# Patient Record
Sex: MALE | Race: WHITE | NOT HISPANIC OR LATINO | Employment: FULL TIME | URBAN - METROPOLITAN AREA
[De-identification: names, ages, dates, MRNs, and addresses within clinical notes are randomized per-mention and may not be internally consistent; named-entity substitution may affect disease eponyms.]

---

## 2024-09-23 ENCOUNTER — APPOINTMENT (OUTPATIENT)
Dept: RADIOLOGY | Facility: MEDICAL CENTER | Age: 54
End: 2024-09-23
Payer: COMMERCIAL

## 2024-09-23 VITALS
HEIGHT: 69 IN | WEIGHT: 229 LBS | SYSTOLIC BLOOD PRESSURE: 160 MMHG | HEART RATE: 59 BPM | BODY MASS INDEX: 33.92 KG/M2 | DIASTOLIC BLOOD PRESSURE: 85 MMHG

## 2024-09-23 DIAGNOSIS — M25.512 LEFT SHOULDER PAIN, UNSPECIFIED CHRONICITY: ICD-10-CM

## 2024-09-23 DIAGNOSIS — M19.012 PRIMARY OSTEOARTHRITIS OF LEFT SHOULDER: Primary | ICD-10-CM

## 2024-09-23 PROCEDURE — 20610 DRAIN/INJ JOINT/BURSA W/O US: CPT | Performed by: ORTHOPAEDIC SURGERY

## 2024-09-23 PROCEDURE — 99204 OFFICE O/P NEW MOD 45 MIN: CPT | Performed by: ORTHOPAEDIC SURGERY

## 2024-09-23 PROCEDURE — 73030 X-RAY EXAM OF SHOULDER: CPT

## 2024-09-23 RX ORDER — ATORVASTATIN CALCIUM 20 MG/1
TABLET, FILM COATED ORAL
COMMUNITY
Start: 2024-08-30

## 2024-09-23 RX ORDER — FENOFIBRATE 145 MG/1
TABLET, COATED ORAL
COMMUNITY
Start: 2024-07-08

## 2024-09-23 RX ORDER — METHYLPREDNISOLONE ACETATE 40 MG/ML
1 INJECTION, SUSPENSION INTRA-ARTICULAR; INTRALESIONAL; INTRAMUSCULAR; SOFT TISSUE
Status: COMPLETED | OUTPATIENT
Start: 2024-09-23 | End: 2024-09-23

## 2024-09-23 RX ORDER — BUPIVACAINE HYDROCHLORIDE 2.5 MG/ML
4 INJECTION, SOLUTION INFILTRATION; PERINEURAL
Status: COMPLETED | OUTPATIENT
Start: 2024-09-23 | End: 2024-09-23

## 2024-09-23 RX ADMIN — METHYLPREDNISOLONE ACETATE 1 ML: 40 INJECTION, SUSPENSION INTRA-ARTICULAR; INTRALESIONAL; INTRAMUSCULAR; SOFT TISSUE at 11:15

## 2024-09-23 RX ADMIN — BUPIVACAINE HYDROCHLORIDE 4 ML: 2.5 INJECTION, SOLUTION INFILTRATION; PERINEURAL at 11:15

## 2024-09-23 NOTE — PROGRESS NOTES
Orthopaedic Surgery - Office Note  Manpreet Sanchez (53 y.o. male)   : 1970   MRN: [id number]  Encounter Date: 2024    Assessment / Plan  Left shoulder severe glenohumeral osteoarthritis      X-rays reviewed and discussed with patient  Patient to be full weightbearing to LUE (Left Upper Extremity) as tolerated  ROM as tolerated to LUE (Left Upper Extremity)  Discussed with patient treatment options moving forward including over the counter analgesics, ROM, physical therapy, corticosteroid injection, total shoulder arthroplasty vs partial shoulder arthroplasty  Information regarding shoulder arthritis and arthroplasty of shoulder were placed in the patient's after visit summary.   Patient was informed shoulder arthroplasty is performed for pain relief, improved ROM, and is an elective procedure.   Patient was informed of post-operative care.  Home exercises were placed in the patient's after visit summary to continue with at home.   Patient to continue at home analgesic regimen with NSAID's  The patient was offered a corticosteroid injection for their left shoulder. They tolerated the procedure well.  The patient was educated they may have some irritation in the next few days and should rest, ice, elevate and perform gentle range of motion exercises. They were advised the medicine should begin to work in a few days time.  The patient was informed corticosteroid injections can be repeated at the earliest every 3 months.      Follow-up:  Return in about 4 months (around 2025) for f/u, L, Shoulder possible surgical discussion .      Chief Complaint / Date of Onset  Left shoulder pain   Injury Mechanism / Date  None  Surgery / Date  None    History of Present Illness   Manpreet Sanchez is a RHD 53 y.o. male who presents for initial evaluation of left shoulder pain on and off for many years. He has not had a specific injuries to the area. He has limited ROM with crepitus and pain. 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 The patient is a friend of Kelley, who trains him and he is not able to perform some of the exercises. He previously had a corticosteroid injection in 2019 with temporary relief. He has an increase in pain when side lying on the left. His goals are to increase ROM.  Patient is pre-diabetic with most recent hemoglobin A1c measuring 6.5 on 5/23/2024.     Treatment Summary  Medications / Modalities  NSAID's  E-Stim  Joshua   Bracing / Immobilization  None  Physical Therapy  4-6  weeks of formal PT in 2019  Exercises at the gym weekly  Injections  1 in 2019   Prior Surgeries  None  Other Treatments  None    Employment / Current Status  Nurse - works for [employer]    Sport / Organization / Current Status  Active       Review of Systems  Pertinent items are noted in HPI.  All other systems were reviewed and are negative.      Physical Exam  /85   Pulse 59   Ht 5' 9\" (1.753 m)   Wt 104 kg (229 lb)   BMI 33.82 kg/m²   Cons: Appears well.  No apparent distress.  Psych: Alert. Oriented x3.  Mood and affect normal.  Eyes: PERRLA, EOMI  Resp: Normal effort.  No audible wheezing or stridor.  CV: Palpable pulse.  No discernable arrhythmia.  No LE edema.  Lymph:  No palpable cervical, axillary, or inguinal lymphadenopathy.  Skin: Warm.  No palpable masses.  No visible lesions.  Neuro: Normal muscle tone.  Normal and symmetric DTR's.     Left Shoulder Exam  Alignment / Posture:  Normal shoulder posture.  Inspection:  No swelling. No edema. No erythema. No ecchymosis. No muscle atrophy. No deformity.  Palpation:  No tenderness.  ROM:  Shoulder FE 95. Shoulder ER 10. Shoulder IR glute. Shoulder AB 90  Strength:  Supraspinatus 5/5. Infraspinatus 5/5. Subscapularis 5/5.  Stability:  Not tested.  Tests: No pertinent positive or negative tests.  Neurovascular:  Sensation intact in Ax/R/M/U nerve distributions. 2+ radial pulse.        Studies Reviewed  XR of left shoulder - taken 9/23/24 " demonstrate severe degenerative changes of the glenohumeral joint with loss of sphere shape of humeral head. Large inferior osteophyte. Early osteophyte formation of the superior humeral head. No acute fracture.      Large joint arthrocentesis: L glenohumeral  Universal Protocol:  Consent: Verbal consent obtained.  Consent given by: patient  Patient understanding: patient states understanding of the procedure being performed  Patient consent: the patient's understanding of the procedure matches consent given  Test results: test results available and properly labeled  Site marked: the operative site was marked  Radiology Images displayed and confirmed. If images not available, report reviewed: imaging studies available  Patient identity confirmed: verbally with patient  Supporting Documentation  Indications: pain   Procedure Details  Location: shoulder - L glenohumeral  Preparation: Patient was prepped and draped in the usual sterile fashion  Needle size: 22 G  Ultrasound guidance: no  Approach: anterior  Medications administered: 4 mL bupivacaine 0.25 %; 1 mL methylPREDNISolone acetate 40 mg/mL    Patient tolerance: patient tolerated the procedure well with no immediate complications  Dressing:  Sterile dressing applied             Medical, Surgical, Family, and Social History  The patient's medical history, family history, and social history, were reviewed and updated as appropriate.    No past medical history on file.    No past surgical history on file.    No family history on file.    Social History     Occupational History    Not on file   Tobacco Use    Smoking status: Not on file    Smokeless tobacco: Not on file   Substance and Sexual Activity    Alcohol use: Not on file    Drug use: Not on file    Sexual activity: Not on file       No Known Allergies      Current Outpatient Medications:     atorvastatin (LIPITOR) 20 mg tablet, , Disp: , Rfl:     fenofibrate (TRICOR) 145 mg tablet, , Disp: , Rfl:       Steph  Anastacio    Scribe Attestation      I,:  Steph Chaves am acting as a scribe while in the presence of the attending physician.:       I,:  Wisam Garcias MD personally performed the services described in this documentation    as scribed in my presence.:

## 2024-09-23 NOTE — PATIENT INSTRUCTIONS
Arthritis of the Shoulder  Each year, millions of people worldwide are diagnosed with some form of arthritis. Simply defined, arthritis is inflammation of one or more of your joints. Ultimately, the inflammation leads to loss of cartilage, which is the soft lining of our joints that allows smooth and full range of motion.      In the shoulder, arthritis causes pain and stiffness that can make it difficult to lift your arm, brush your hair, reach up to a high shelf, or perform other tasks.    Although there is no predictable way to regrow cartilage -- and, thus, no cure for arthritis of the shoulder -- there are many treatment options available. Using these, most people with arthritis are able to manage pain and stay active.    Anatomy  The shoulder is a ball-and-socket joint made up of three bones:  The humerus (upper arm bone)  The scapula (shoulder blade)  The clavicle (collarbone)    The ball portion of the upper arm bone (humeral head) fits into a rounded socket in the shoulder blade. This socket is called the glenoid. A combination of muscles and tendons keeps your arm bone centered in your shoulder socket. These tissues are called the rotator cuff.    There are two joints in the shoulder, and both may be affected by arthritis:  One joint is located on the top of the shoulder, where the clavicle meets the tip of the shoulder blade (acromion). This is called the acromioclavicular (AC) joint.  The second is where the head of the humerus fits into the scapula and is called the glenohumeral joint.    The bones and joints of the shoulder.   Reproduced with permission from MASSIEL Alvarez, ed: Musculoskeletal Medicine. [place], [place], [employer], 2003.    To provide you with effective treatment, your doctor will need to determine which joint is affected and which type of arthritis you have.    Description  Five major types of arthritis typically affect the shoulder.    Osteoarthritis  Also  "known as \"wear-and-tear\" arthritis, osteoarthritis is a condition that destroys the smooth outer covering (articular cartilage) of bone. As the articular cartilage wears away, it becomes frayed and rough, and the protective space between the bones decreases. During movement, the bones of the joint rub against each other,  causing pain; this is often referred to as \"bone on bone\" arthritis.    Osteoarthritis usually affects people over the age of 50 and is more common in the acromioclavicular joint than in the glenohumeral shoulder joint.  While osteoarthritis of the AC joint can be seen on X-rays, it typically causes few to no symptoms.  Glenohumeral osteoarthritis causes symptoms more often than not.    (Left) An illustration of damaged cartilage in the glenohumeral joint. (Right) This X-ray of the shoulder shows osteoarthritis and a decreased joint space.   (Left) Reproduced with permission from MIRIAM Izquierdo, ed: Essentials of Musculoskeletal Care, ed 4. [place], [place], [employer], 2010.      Rheumatoid Arthritis  Rheumatoid arthritis (RA) is a chronic disease that attacks multiple joints throughout the body. It is symmetrical, meaning it usually affects the same joint on both sides of the body.  The joints of your body are covered with a lining -- called synovium -- that lubricates the joint and makes it easier to move. Rheumatoid arthritis causes the lining to swell, which causes pain and stiffness in the joint.    Rheumatoid arthritis is an autoimmune disease. This means that the immune system attacks its own tissues. In RA, the defenses that protect the body from infection instead damage normal tissue (such as cartilage and ligaments) and soften bone.    Rheumatoid arthritis is equally common in both joints of the shoulder (AC joint and glenohumeral joint).    Posttraumatic Arthritis  Posttraumatic arthritis is a form of osteoarthritis that develops after an injury, such as a fracture " or dislocation of the shoulder. It can affect both the AC and glenohumeral joints.    Rotator Cuff Tear Arthropathy  Arthritis can also develop after a large, long-standing rotator cuff tendon tear. The torn rotator cuff can no longer hold the head of the humerus in the glenoid socket, and the humerus can move upward and rub against the acromion. This can damage the surfaces of the bones, causing arthritis to develop.    The combination of a large rotator cuff tear and advanced arthritis can lead to severe pain and weakness, and the patient may not be able to lift their arm away from the side.    Rotator cuff arthropathy.     Avascular Necrosis  Avascular necrosis (AVN) of the shoulder is a painful condition that occurs when the blood supply to the head of the humerus is disrupted. Because bone cells die without a blood supply, AVN can ultimately lead to destruction of the shoulder joint and arthritis.    Avascular necrosis develops in stages. As it progresses, the dead bone gradually collapses, which damages the articular cartilage covering the bone and leads to arthritis. At first, AVN affects only the head of the humerus, but as AVN progresses, the collapsed head of the humerus can damage the glenoid socket as well.    Causes of AVN include:  High dose steroid use  Heavy alcohol consumption  Sickle cell disease  Traumatic injury, such as fractures of the shoulder    In some cases, no cause can be identified; this is referred to as idiopathic AVN.    Symptoms  Pain. The most common symptom of arthritis of the shoulder is pain. This pain is aggravated by activity and progressively gets worse over time. The location of the pain will vary, depending on which shoulder joint is affected:  If the glenohumeral joint is affected, the pain is centered in the side or back of the shoulder and may intensify with changes in the weather. Patients complain of an ache deep in the joint.  The pain of arthritis in the AC joint is  focused on the top of the shoulder.   Someone with rheumatoid arthritis may have pain throughout the shoulder if both the glenohumeral and AC joints are affected.    As the arthritis progresses (worsens), any movement of the shoulder causes pain. Night pain is common, and sleeping may  be difficult.  Other symptoms may include:  Limited range of motion. This is a common symptom. It may become more difficult to lift your arm to brush your hair or reach up to a shelf.     Crepitus. You may hear a grinding, clicking, or snapping sound (crepitus) as you move your shoulder. Crepitus is sometimes painful and can be loud enough for other people to hear.    Doctor Examination  Medical History and Physical Examination  After discussing your symptoms and medical history, your doctor will examine your shoulder.  During the physical examination, your doctor will look for:  Weakness (atrophy) in the muscles  Tenderness to touch  Extent of passive (assisted) and active (self-directed) range of motion  Any signs of injury to the muscles, tendons, and ligaments surrounding the joint  Signs of previous injuries or surgeries  Involvement of other joints (an indication of rheumatoid arthritis)  Crepitus (a grinding sensation inside the joint) with movement  Pain when pressure is placed on the joint    X-Rays  X-rays are imaging tests that create detailed pictures of dense structures, like bone. They can help distinguish among various forms of arthritis.  X-rays of an arthritic shoulder will show:  A narrowing of the joint space  Changes in the bone  The formation of bone spurs (osteophytes)    This X-ray shows severe osteoarthritis of the glenohumeral joint.   Reproduced with permission from Gaston VELASQUEZ (ed): Total Shoulder Arthoplasty. [place], [place], [employer], 2000, p 18.      Some patients may have arthritis on an X-ray but few symptoms related directly to the arthritis. For instance, soreness in the  surrounding muscles -- and not the arthritis -- could be causing the symptoms.  To confirm the diagnosis, your doctor may inject a local anesthetic (with or without cortisone) into the joint.  If it temporarily relieves the pain, even temporarily, it is reasonable to assume that the symptoms are related to arthritis and treating the arthritis will relieve the pain.  If the injection gives no pain relief at all, then other explanations for the pain should be considered.    Treatment  Nonsurgical Treatment  As with other arthritic conditions, initial treatment of arthritis of the shoulder is nonsurgical. Your doctor may recommend the following:  Rest or change in activities. You may need to change the way you move your arm to avoid provoking pain.  Physical therapy exercises may improve the range of motion, strength, and function in your shoulder.   Nonsteroidal anti-inflammatory drugs (NSAIDs), such as aspirin, ibuprofen, or naproxen, may reduce inflammation and pain. These medications can irritate the stomach lining and cause internal bleeding. They should be taken with food. Consult with your doctor before taking over-the-counter NSAIDs if you have a history of ulcers or are taking blood thinning medication.  Corticosteroid injections in the shoulder can dramatically reduce the inflammation and pain. However, the effect is often temporary.  Moist heat may provide temporary relief.  Icing your shoulder for 20 to 30 minutes 2 or 3 times a day can help to reduce inflammation and ease pain. Do not apply ice directly to your skin.  If you have rheumatoid arthritis, your doctor (typically a rheumatologist) may prescribe a disease-modifying drug, such as methotrexate.  Dietary supplements, such as glucosamine and chondroitin sulfate may help relieve pain. (Note: There is little scientific evidence to support the use of glucosamine and chondroitin sulfate to treat arthritis. In addition, the [place] Food and Drug  Administration does not test dietary supplements. These compounds may cause negative interactions with other medications. Always consult your doctor before taking dietary supplements.)  Joint lubricants, which are currently being used for treatment of knee arthritis, have not been shown to be effective in shoulder arthritis and are not currently recommended.    Surgical Treatment  Your doctor may consider surgery if your pain causes disability and is not relieved with nonsurgical treatment.    Arthroscopy. Cases of mild glenohumeral arthritis may be treated with arthroscopy, During arthroscopy, the doctor inserts a small camera, called an arthroscope, into the shoulder joint. The camera displays pictures on a video monitor, and the doctor uses these images to guide miniature surgical instruments.    Because the arthroscope and surgical instruments are thin, the surgeon can use very small incisions rather than the larger incision needed for standard, open surgery.    During the procedure, your doctor can debride (clean out) the inside of the joint by doing one or more of the following:  Removing any loose pieces of cartilage  Cleaning up the inflammation  Shaving bone spurs that are causing pain    Pain relief from this type of procedure is often temporary and it is not typically recommended for advanced arthritis. If the arthritis progresses, you may need further surgery in the future.  Shoulder joint replacement (arthroplasty). Advanced arthritis of the glenohumeral joint can be treated with shoulder replacement surgery. In this procedure, damaged parts of the shoulder are removed and replaced with artificial components, called a prosthesis.    Replacement surgery options include:  Hemiarthroplasty. Just the head of the humerus is replaced with an artificial component.  Total shoulder arthroplasty. Both the head of the humerus and the glenoid are replaced. A plastic cup is fitted into the glenoid, and a metal ball  is attached to the top of the humerus.  Reverse total shoulder arthroplasty. In a reverse total shoulder replacement, the socket and metal ball are opposite a conventional total shoulder arthroplasty: The metal ball is fixed to the glenoid, and the plastic cup is fixed to the upper end of the humerus. A reverse total shoulder replacement works better for people with cuff tear arthropathy because it relies on different muscles -- not the rotator cuff -- to move the arm. Specifically, movement relies on the large deltoid muscle on the outside of the shoulder.    Discuss with your surgeon which option is best for you.    (Left) A conventional total shoulder replacement (arthroplasty) mimics the normal anatomy of the shoulder. (Right) In a reverse total shoulder replacement, the plastic cup inserts on the humerus, and the metal ball screws into the shoulder socket.      Distal Clavicle Resection/Excision. This is most common surgical procedure used to treat arthritis of the AC joint. Depending on your specific situation, your surgery may be performed either arthroscopically or as a traditional open procedure.    In this procedure, the doctor removes a small amount of bone from the end of the collarbone, leaving a space that gradually fills in with scar tissue.  Recovery. Surgical treatment of arthritis of the shoulder is generally very effective in reducing pain and restoring motion.  Recovery time and rehabilitation plans depend on the type of surgery performed.  Other factors can also affect outcomes after surgery, including:  Other medical conditions you have (high blood pressure, diabetes, etc.)  Smoking  Depression    Pain management. After surgery, you will feel some pain. This is a natural part of the healing process. Your doctor and nurses will work to reduce your pain, which can help you recover from surgery faster.  Medications are often prescribed for short-term pain relief after surgery. Many types of  medicines are available to help manage pain, including opioids, non-steroidal anti-inflammatory drugs (NSAIDs), and local anesthetics. Your doctor may use a combination of these medications to improve pain relief, as well as minimize the need for opioids.    Be aware that although opioids help relieve pain after surgery, they are narcotics and can be addictive. Opioid dependency and overdose have become critical public health issues in the [place] It is important to use opioids only as directed by your doctor and to stop taking them as soon as your pain begins to improve. Talk to your doctor if your pain has not begun to improve within a few days of your surgery.  Complications. As with all surgeries, there are some risks and possible complications. Potential problems after shoulder surgery include:  Infection  Excessive bleeding  Blood clots  Damage to blood vessels or nerves    Your doctor will discuss the possible complications with you before your operation.    Future Developments  Research is being conducted on shoulder arthritis and its treatment.  In many cases, it is not known why some people develop arthritis and others do not. Research is being done to uncover some of the causes of arthritis of the shoulder.  New medications to treat rheumatoid arthritis are being investigated.  Much research is being done on shoulder joint replacement surgery, including the development of different joint prosthesis designs.  Researchers are studying the use of biologic materials to resurface an arthritic shoulder. Biologic materials are tissue grafts that promote growth of new tissue in the body and foster healing.      Shoulder Joint Replacement  Although shoulder joint replacement is less common than knee or hip replacement, it is just as successful in relieving joint pain.    Shoulder replacement surgery was first performed in the [place] in the 1950s to treat severe shoulder fractures. Over the years, shoulder  joint replacement has come to be used for many other painful conditions of the shoulder, such as different forms of arthritis.    Today, about 53,000 people in the [place] have shoulder replacement surgery each year, according to the Agency [employer] and [employer]. This compares to more than 900,000 Americans a year who have knee and hip replacement surgery.    If nonsurgical treatments like medications and activity changes are no longer helpful for relieving pain, you may want to consider shoulder joint replacement surgery. Joint replacement surgery is a safe and effective procedure to relieve pain and help you resume everyday activities.    Whether you have just begun exploring treatment options or have already decided to have shoulder joint replacement surgery, this article will help you understand more about this valuable procedure.    Anatomy  Your shoulder is made up of three bones: your upper arm bone (humerus), your shoulder blade (scapula), and your collarbone (clavicle). The shoulder is a ball-and-socket joint: The ball, or head, of your upper arm bone fits into a shallow socket in your shoulder blade. This socket is called the glenoid.    The bones of a healthy shoulder joint.     The surfaces of the bones where they touch are covered with articular cartilage, a smooth substance that protects the bones and enables them to move easily. A thin, smooth tissue called synovial membrane covers all remaining surfaces inside the shoulder joint. In a healthy shoulder, this membrane makes a small amount of fluid that lubricates the cartilage and eliminates almost any friction in your shoulder.    The muscles and tendons that surround the shoulder provide stability and support.    All of these structures allow the shoulder to rotate through a greater range of motion than any other joint in the body.    Description    Shoulder joint replacement.   © 2011, [employer]     In shoulder replacement surgery, the  damaged parts of the shoulder are removed and replaced with artificial components, called a prosthesis.  The treatment options are either replacement of just the head of the humerus bone (ball), or replacement of both the ball and the socket (glenoid).    Cause  Several conditions can cause shoulder pain and disability, and lead patients to consider shoulder joint replacement surgery.    Osteoarthritis (Degenerative Joint Disease)  Osteoarthritis is an age-related wear-and-tear type of arthritis. It usually occurs in people 50 years of age and older, but may occur in younger people, too. The cartilage that cushions the bones of the shoulder softens and wears away. The bones then rub against one another. Over time, the shoulder joint slowly becomes stiff and painful.    Unfortunately, there is no way to prevent the development of osteoarthritis. It is a common reason people have shoulder replacement surgery.    Osteoarthritis of the shoulder. As the cartilage that covers the ends of the bones wears away, it can result in bone rubbing on bone and produce painful bone spurs.     Rheumatoid Arthritis  This is a disease in which the synovial membrane that surrounds the joint becomes inflamed and thickened. This chronic inflammation can damage the cartilage and eventually cause cartilage loss, pain, and stiffness. Rheumatoid arthritis is the most common form of a group of disorders termed inflammatory arthritis.    Post-traumatic Arthritis  This can follow a serious shoulder injury. Fractures of the bones that make up the shoulder or tears of the shoulder tendons or ligaments may damage the articular cartilage over time. This causes shoulder pain and limits shoulder function.    Rotator Cuff Tear Arthropathy  A patient with a very large, long-standing rotator cuff tear may develop cuff tear arthropathy. In this condition, the changes in the shoulder joint due to the rotator cuff tear may lead to arthritis and destruction  of the joint cartilage.    Avascular Necrosis (Osteonecrosis)  Avascular necrosis, or osteonecrosis, is a painful condition that occurs when the blood supply to the bone is disrupted. Because bone cells die without a blood supply, osteonecrosis can ultimately cause destruction of the shoulder joint and lead to arthritis. Chronic steroid use, deep sea diving, severe fracture of the shoulder, sickle cell disease, and heavy alcohol use are all risk factors for avascular necrosis.    Severe Fractures  A severe fracture of the shoulder is another common reason people have shoulder replacements. When the head of the upper arm bone is shattered, it may be very difficult for a doctor to put the pieces of bone back in place. In addition, the blood supply to the bone pieces can be interrupted. In this case, a surgeon may recommend a shoulder replacement. Older patients with osteoporosis are most at risk for severe shoulder fractures.    Failed Previous Shoulder Replacement Surgery  Although uncommon, some shoulder replacements fail, most often because of implant loosening, wear, infection, and dislocation. When this occurs, a second joint replacement surgery -- called a revision surgery -- may be necessary.    Is Shoulder Joint Replacement for You?  The decision to have shoulder replacement surgery should be a cooperative one between you, your family, your family physician, and your orthopaedic surgeon.    There are several reasons why your doctor may recommend shoulder replacement surgery. People who benefit from surgery often have:  Severe shoulder pain that interferes with everyday activities, such as reaching into a cabinet, dressing, toileting, and washing.  Moderate to severe pain while resting. This pain may be severe enough to prevent a good night's sleep.  Loss of motion and/or weakness in the shoulder.  Failure to substantially improve with other treatments such as anti-inflammatory medications, cortisone  injections, and/or physical therapy.    Orthopaedic Evaluation  Your family physician may refer you to an orthopaedic surgeon for a thorough evaluation to determine if you can benefit from this surgery.  An evaluation with an orthopaedic surgeon consists of several components:  A medical history. Your orthopaedic surgeon will gather information about your general health and ask you about the extent of your shoulder pain and your ability to function.  A physical examination. This will assess shoulder motion, stability, and strength.  X-rays. X-rays help to determine the extent of damage in your shoulder. They can show loss of the normal joint space between bones, flattening or irregularity in the shape of the bone, bone spurs, and loose pieces of cartilage or bone that may be floating inside the joint.  Other tests. Occasionally, your doctor may order blood tests, a computed tomography (CT) scan, a magnetic resonance imaging (MRI) scan, or a bone scan to determine the condition of the bone and soft tissues of your shoulder.  Your orthopaedic surgeon will review the results of your evaluation with you and discuss whether shoulder joint replacement is the best method to relieve your pain and improve your function. Other treatment options -- including medications, injections, physical therapy, or other types of surgery -- will also be discussed and considered.    (Left) An X-ray of a healthy shoulder joint. (Right) Osteoarthritis of the shoulder. Note the decreased joint space (arrow).     Shoulder Replacement Options  Shoulder replacement surgery is highly technical. It should be performed by a surgical team with experience in this procedure.    There are different types of shoulder replacements. Your surgeon will evaluate your situation carefully before making any decisions. They will discuss with you which type of replacement will best meet your health needs. Do not hesitate to ask which type of implant will be used  in your situation, and why that choice is right for you.    Total Shoulder Replacement  The standard total shoulder replacement involves replacing the arthritic joint surfaces with a highly polished metal ball attached to a stem, and a plastic socket.    A total shoulder joint replacement.     These components come in various sizes. They may be either cemented or press fit into the bone. If the bone is of good quality, your surgeon may choose to use a non-cemented (press-fit) humeral component. If the bone is soft, the humeral component may be implanted with bone cement. In most cases, an all-plastic glenoid (socket) component is implanted with bone cement.    Implantation of a glenoid component is not advised if:  The glenoid has good cartilage  The glenoid bone is severely deficient  The rotator cuff tendons are irreparably torn  Patients with bone-on-bone osteoarthritis and intact rotator cuff tendons are generally good candidates for conventional total shoulder replacement.    These X-rays were taken before and after total shoulder replacement surgery for osteoarthritis.     Stemmed Hemiarthroplasty  Depending on the condition of your shoulder, your surgeon may replace only the ball. This procedure is called a hemiarthroplasty. In a traditional hemiarthroplasty, the surgeon replaces the head of the humerus with a metal ball and stem, similar to the component used in a total shoulder replacement. This is called a stemmed hemiarthroplasty.    Some surgeons recommend hemiarthroplasty when the humeral head is severely fractured but the socket is normal. Other indications for a hemiarthroplasty include:  Arthritis that involves only the head of the humerus, with a glenoid that has a healthy and intact cartilage surface  Shoulders with severely weakened bone in the glenoid  Some shoulders with severely torn rotator cuff tendons and arthritis  Sometimes, surgeons make the decision between a total shoulder replacement  and a hemiarthroplasty in the operating room at the time of the surgery.    Studies show that patients with osteoarthritis get better pain relief from total shoulder arthroplasty than from hemiarthroplasty.    Resurfacing Hemiarthroplasty  Resurfacing hemiarthroplasty involves replacing just the joint surface of the humeral head with a cap-like prosthesis without a stem. With its bone-preserving advantage, it offers those with arthritis of the shoulder an alternative to the standard stemmed shoulder replacement.  Resurfacing hemiarthroplasty may be an option for you if:  The glenoid still has an intact cartilage surface  There has been no fresh fracture of the humeral neck or head  There is a desire to preserve humeral bone  For patients who are young or very active, resurfacing hemiarthroplasty avoids the risks of component wear and loosening that may occur with conventional total shoulder replacements in this patient population. Due to its more conservative nature, resurfacing hemiarthroplasty may be easier to convert to total shoulder replacement, if necessary, at a later time.    This X-ray shows the cap-like prosthesis used in resurfacing hemiarthroplasty.     Reverse Total Shoulder Replacement  Another type of shoulder replacement is called reverse total shoulder replacement. Reverse total shoulder replacement is used for people who have:  Completely torn rotator cuffs with severe arm weakness  The effects of severe arthritis and rotator cuff tearing (cuff tear arthropathy)  Had a previous shoulder replacement that failed    An X-ray of a reverse total shoulder replacement.     For these individuals, a conventional total shoulder replacement can still leave them with pain. They may also be unable to lift their arm up past a 90-degree angle. Not being able to lift your arm away from the side can be severely debilitating.    In reverse total shoulder replacement, the socket and metal ball are switched: A metal  ball is attached to the shoulder bone, and a plastic socket is attached to the upper arm bone. This allows the patient to use the deltoid muscle instead of the torn rotator cuff to lift the arm.    (Left) Rotator cuff arthropathy. (Right) The reverse total shoulder replacement allows other muscles -- such as the deltoid -- to do the work of the damaged rotator cuff tendons.     Complications  Your orthopaedic surgeon will explain the potential risks and complications of shoulder joint replacement, including those related to the surgery itself and those that can occur over time after your surgery.  When complications occur, most are successfully treatable. Possible complications include the following.  Infection. Infection is a complication of any surgery. In shoulder joint replacement, infection may occur in the wound or deep around the prosthesis. It may happen while in the hospital or after you go home. It may even occur years later. Minor infections in the wound area are generally treated with antibiotics. Major or deep infections may require more surgery and removal of the prosthesis. Any infection in your body can spread to your joint replacement.  Prosthesis Problems. Although prosthesis designs and materials, as well as surgical techniques, continue to advance, the prosthesis may wear down and the components may loosen. The components of a shoulder replacement may also dislocate. Excessive wear, loosening, or dislocation may require additional surgery (revision procedure).  Nerve damage. Nerves in the vicinity of the joint replacement may be damaged during surgery, although this type of injury is infrequent. Over time, these nerve injuries often improve and may completely recover.    Preparing for Surgery  Medical Evaluation  If you decide to have shoulder replacement surgery, your orthopaedic surgeon may ask you to schedule a complete physical examination with your family physician several weeks before  surgery. This is needed to make sure you are healthy enough to have the surgery and complete the recovery process. Many patients with chronic medical conditions, like heart disease, must also be evaluated by a specialist, such as a cardiologist, before the surgery.    Medications  Be sure to talk to your orthopaedic surgeon about the medications you take. Some medications may need to be stopped before surgery. For example, the following over-the-counter medicines may cause excessive bleeding and should be stopped 2 weeks before surgery:  Non-steroidal anti-inflammatory drugs (NSAIDs), such as aspirin, ibuprofen, and naproxen  Most arthritis medications  If you take blood thinners, either your primary care doctor or cardiologist will advise you about stopping these medications before surgery.    Home Planning  Making simple changes in your home before surgery can make your recovery period easier.    For the first several weeks after your surgery, it will be hard to reach high shelves and cupboards. Before your surgery, be sure to go through your home and place any items you may need afterwards on low shelves.    When you come home from the hospital, you will need help for a few weeks with some daily tasks like dressing, bathing, cooking, and laundry. If you will not have any support at home immediately after surgery, you may need a short stay in a rehabilitation facility until you become more independent.    Your Surgery  Before Your Operation  Wear loose-fitting clothes and a button-front shirt when you go to the hospital for your surgery. After surgery, you will be wearing a sling and will have limited use of your arm.    You will most likely be admitted to the hospital on the day of your surgery. After admission, you will be taken to the preoperative preparation area and will meet a doctor from the anesthesia department.    You, your anesthesiologist, and your surgeon will discuss the type of anesthesia to be  used. You may be provided a general anesthetic (you are asleep for the entire operation), a regional anesthetic (you may be awake but have no feeling around the surgical area), or a combination of both types.    Surgical Procedure  The procedure to replace your shoulder joint with an artificial device usually takes about 2 hours.    After surgery, you will be moved to the recovery room, where you will remain for several hours while your recovery from anesthesia is monitored. After you wake up, you will be taken to your hospital room.    Recovery  Your medical team will give you several doses of antibiotics to prevent infection. Most patients are able to eat solid food and get out of bed the day after surgery. You will most likely be able to go home on the first, second, or third day after surgery.    Pain Management  After surgery, you will feel some pain. This is a natural part of the healing process. Your doctor and nurses will work to reduce your pain, which can help you recover from surgery faster.    Medications are often prescribed for short-term pain relief after surgery. Many types of medicines are available to help manage pain, including opioids, non-steroidal anti-inflammatory drugs (NSAIDs), and local anesthetics. Your doctor may use a combination of these medications to improve pain relief, as well as minimize the need for opioids.    Be aware that although opioids help relieve pain after surgery, they are a narcotic and can be addictive. Opioid dependency and overdose have become a critical public health issues in the [place] It is important to use opioids only as directed by your doctor and to stop taking them as soon as your pain begins to improve. Talk to your doctor if your pain has not begun to improve within a few days of your surgery.    Pain management is an important part of your recovery. You will begin physical therapy soon after surgery, and when you feel less pain, you can start moving sooner  and get your strength back more quickly. Talk with your doctor if postoperative pain becomes a problem.    Rehabilitation  A careful, well-planned rehabilitation program is critical to the success of a shoulder replacement. You usually start gentle physical therapy soon after the operation. Your surgeon or physical therapist will provide you with a home exercise program to strengthen your shoulder and improve flexibility.    Your Recovery At Home  When you leave the hospital, your arm will be in a sling. You will need the sling to support and protect your shoulder for the first 2 to 6 weeks after surgery, depending on the complexity of your surgery and your surgeon's preference.    Wearing a sling will protect your shoulder after surgery.     Wound care. You will have staples running along your wound or a suture beneath your skin. The staples will be removed several weeks after surgery. A dissolving suture beneath your skin will not require removal.    Avoid soaking the wound in water until it has thoroughly sealed and dried. You may continue to bandage the wound to prevent irritation from clothing.    Activity. Exercise is a critical component of home care, particularly during the first few weeks after surgery. Follow your surgeon's home exercise plan to help you regain strength. Most patients are able to perform simple activities such as eating, dressing, and grooming, within 2 weeks after surgery. Some pain with activity and at night is common for several weeks after surgery.    You are not allowed to drive a car for 2 to 6 weeks after surgery.    Do's and Don'ts  The success of your surgery will depend largely on how well you follow your orthopaedic surgeon's instructions at home during the first few weeks after surgery. Here are some common do's and don'ts for when you return home:  Don't use the arm to push yourself up in bed or from a chair because this requires forceful contraction of muscles.  Do follow the  program of home exercises prescribed for you. You may need to do the exercises 2 to 3 times a day for a month or more.  Don't overdo it! If your shoulder pain was severe before the surgery, the experience of pain-free motion may lull you into thinking that you can do more than is prescribed. Early overuse of the shoulder may result in severe limitations in motion.  Don't lift anything heavier than a glass of water for the first 2 to 4 weeks after surgery.  Do ask for assistance. Your physician may be able to recommend an agency or facility to help if you do not have home support.  Don't participate in contact sports or do any repetitive heavy lifting after your shoulder replacement.  Do avoid placing your arm in any extreme position, such as straight out to the side or behind your body for the first 6 weeks after surgery.  Many thousands of patients have experienced an improved quality of life after shoulder joint replacement surgery. They experience less pain, improved motion and strength, and better function.    Research  There are ongoing efforts to design and develop newer and better shoulder replacements that can be performed with less invasive surgical techniques. And researchers are collecting data to determine which patients are the best candidates for which type of shoulder replacement surgery. This information will allow your surgeon to offer you the best recommendation for the treatment of your arthritic shoulder.    To help doctors in the management of glenohumeral joint arthritis -- the American Academy of Orthopa[employer]geons has conducted research to provide some useful guidelines. These are recommendations only and may not apply to every case. For more information: Glenohumeral Joint Osteoarthritis - Clinical Practice Guideline (CPG)  American Academy of Orth[employer]geons (aaos.org)        Home exercise plan                      Overhead pulley's   Hold for 20-30 seconds   Perform 5 times          Pull up bar or lat pull down hang  Attempt to lower your bottom to the bench below you while having hands overhead.  Hold for 20-30 seconds   Perform 5 times       Wall walks   Walk your fingers/hand up the wall and hold for 30 seconds at the top  3 x 10

## 2025-01-24 VITALS — BODY MASS INDEX: 34.48 KG/M2 | WEIGHT: 232.8 LBS | HEIGHT: 69 IN

## 2025-01-24 DIAGNOSIS — E11.9 TYPE 2 DIABETES MELLITUS WITHOUT COMPLICATION, WITHOUT LONG-TERM CURRENT USE OF INSULIN (HCC): ICD-10-CM

## 2025-01-24 DIAGNOSIS — M19.012 PRIMARY OSTEOARTHRITIS OF LEFT SHOULDER: Primary | ICD-10-CM

## 2025-01-24 PROCEDURE — 99214 OFFICE O/P EST MOD 30 MIN: CPT | Performed by: ORTHOPAEDIC SURGERY

## 2025-01-24 RX ORDER — TRANEXAMIC ACID 10 MG/ML
1000 INJECTION, SOLUTION INTRAVENOUS ONCE
OUTPATIENT
Start: 2025-01-24 | End: 2025-01-24

## 2025-01-24 RX ORDER — CEFAZOLIN SODIUM 2 G/50ML
2000 SOLUTION INTRAVENOUS ONCE
OUTPATIENT
Start: 2025-01-24 | End: 2025-01-24

## 2025-01-24 RX ORDER — OMEGA-3/DHA/EPA/FISH OIL 910-1400MG
CAPSULE ORAL
COMMUNITY
Start: 2023-01-02

## 2025-01-24 RX ORDER — CHLORHEXIDINE GLUCONATE ORAL RINSE 1.2 MG/ML
15 SOLUTION DENTAL ONCE
OUTPATIENT
Start: 2025-01-24 | End: 2025-01-24

## 2025-01-24 RX ORDER — LORAZEPAM 2 MG/1
2 TABLET ORAL
Qty: 2 TABLET | Refills: 0 | Status: SHIPPED | OUTPATIENT
Start: 2025-01-24

## 2025-01-24 NOTE — PROGRESS NOTES
Orthopaedic Surgery - Office Note  Manpreet Sanchez (54 y.o. male)   : 1970   MRN: [id number]  Encounter Date: 2025    Assessment / Plan  Left shoulder severe glenohumeral osteoarthritis     The diagnosis and treatment options were reviewed.  The patient wishes to proceed with left total shoulder arthroplasty .  Scheduled for 2025  The risks, benefits, and alternatives were discussed.  Written consent was obtained.  PT referral given today, he can begin 1 week post op   CT blueprint ordered today   MRI ordered today   [facility]   Sling with pillow ordered today   Continue with HEP and exercise as tolerated  Ice, heat and anti-inflammatories prn   Follow-up:  Return for 2 week follow up with [facility] post op .      Chief Complaint / Date of Onset  Left shoulder pain, chronic, known OA  Injury Mechanism / Date  None  Surgery / Date  None    History of Present Illness   Manpreet Sanchez is a 54 y.o. male who presents for follow up of left GH OA. During this previous visit, he received CSI which he states only gave him a day or two of relief. He continues to do his exercise  and weight lift weekly but notes little improvement with function and pain. He notes having to have his wife assist him with daily activities due to his pain and limited motion. He has pain at night trying to sleep. He is interested in discussing surgery today.     Treatment Summary  Medications / Modalities  NSAID's  E-Stim  Joshua   Bracing / Immobilization  None  Physical Therapy  4-6  weeks of formal PT in   Continue with PT directed HEP  Exercises at the gym weekly  Injections  2024 L GH CSI with mild relief     L GH CSI with minimal relief    Prior Surgeries  None  Other Treatments  None     Employment / Current Status  Nurse - works for [employer]     Sport / Organization / Current Status  Active      Review of Systems  Pertinent items are noted in HPI.  All other systems were reviewed and are  "negative.      Physical Exam  Ht 5' 9\" (1.753 m)   Wt 106 kg (232 lb 12.8 oz)   BMI 34.38 kg/m²   Cons: Appears well.  No apparent distress.  Psych: Alert. Oriented x3.  Mood and affect normal.  Eyes: PERRLA, EOMI  Resp: Normal effort.  No audible wheezing or stridor.  CV: Palpable pulse.  No discernable arrhythmia.  No LE edema.  Lymph:  No palpable cervical, axillary, or inguinal lymphadenopathy.  Skin: Warm.  No palpable masses.  No visible lesions.  Neuro: Normal muscle tone.  Normal and symmetric DTR's.     Left Shoulder Exam  Alignment / Posture:  Normal shoulder posture.  Inspection:  No swelling. No ecchymosis.  Palpation:   mild shoulder tenderness. No effusion.  ROM:  Shoulder FE AROM 90. Shoulder ER 0. Shoulder IR hip.  Strength:  Supraspinatus 5/5. Infraspinatus 5/5. Subscapularis 5/5.  Stability:  No objective shoulder instability.  Tests: No pertinent positive or negative tests.  Neurovascular:  Sensation intact in Ax/R/M/U nerve distributions. 2+ radial pulse.       Studies Reviewed  I have personally reviewed pertinent films in PACS.  XR of left shoulder - taken 9/23/24 demonstrate severe degenerative changes of the glenohumeral joint with loss of sphere shape of humeral head. Large inferior osteophyte. Early osteophyte formation of the superior humeral head. No acute fracture.     Procedures  No procedures today.    Medical, Surgical, Family, and Social History  The patient's medical history, family history, and social history, were reviewed and updated as appropriate.    History reviewed. No pertinent past medical history.    History reviewed. No pertinent surgical history.    History reviewed. No pertinent family history.    Social History     Occupational History    Not on file   Tobacco Use    Smoking status: Never    Smokeless tobacco: Never   Vaping Use    Vaping status: Never Used   Substance and Sexual Activity    Alcohol use: Yes    Drug use: Never    Sexual activity: Not on file       No " Known Allergies      Current Outpatient Medications:     atorvastatin (LIPITOR) 20 mg tablet, , Disp: , Rfl:     fenofibrate (TRICOR) 145 mg tablet, , Disp: , Rfl:     Omega-3 1400 MG CAPS, , Disp: , Rfl:       Gisella Hayes    Scribe Attestation      I,:  Gisella Hayes am acting as a scribe while in the presence of the attending physician.:       I,:  Wisam Garcias MD personally performed the services described in this documentation    as scribed in my presence.:            normal...

## 2025-01-31 DIAGNOSIS — M19.012 PRIMARY OSTEOARTHRITIS OF LEFT SHOULDER: Primary | ICD-10-CM

## 2025-01-31 RX ORDER — FOLIC ACID 1 MG/1
1 TABLET ORAL DAILY
Qty: 30 TABLET | Refills: 0 | Status: SHIPPED | OUTPATIENT
Start: 2025-01-31 | End: 2025-03-02

## 2025-01-31 RX ORDER — ASCORBIC ACID 500 MG
500 TABLET ORAL 2 TIMES DAILY
Qty: 60 TABLET | Refills: 0 | Status: SHIPPED | OUTPATIENT
Start: 2025-01-31 | End: 2025-03-02

## 2025-01-31 RX ORDER — FERROUS SULFATE 324(65)MG
324 TABLET, DELAYED RELEASE (ENTERIC COATED) ORAL
Qty: 60 TABLET | Refills: 0 | Status: SHIPPED | OUTPATIENT
Start: 2025-01-31 | End: 2025-03-02

## 2025-01-31 RX ORDER — MULTIVITAMIN
1 TABLET ORAL DAILY
Qty: 30 TABLET | Refills: 0 | Status: SHIPPED | OUTPATIENT
Start: 2025-01-31

## 2025-01-31 RX ORDER — MUPIROCIN 20 MG/G
OINTMENT TOPICAL 2 TIMES DAILY
Qty: 15 G | Refills: 0 | Status: SHIPPED | OUTPATIENT
Start: 2025-01-31

## 2025-03-05 ENCOUNTER — TELEPHONE (OUTPATIENT)
Age: 55
End: 2025-03-05

## 2025-03-05 NOTE — TELEPHONE ENCOUNTER
Caller: Devi from [facility]    Doctor: Dr. Garcias     Reason for call: Devi calling stating that she needs validity extension on the MRI authorization of Left shoulder as the auth is only good until the 3/9/25 and patient has appt on 3/17/25.  Any questions, Devi can be reached at the number below.     Call back#: [phone]

## 2025-03-06 ENCOUNTER — TELEPHONE (OUTPATIENT)
Age: 55
End: 2025-03-06

## 2025-03-06 NOTE — TELEPHONE ENCOUNTER
Patient called in to notify me that his wife is sick and needs to undergo a more aggressive form of treatment. He needs to cancel his surgery as he will be her caregiver during this time. Advised him to reach out when he is able to reschedule to see Dr. Garcias in the office so we can work on getting him back on the schedule.

## 2025-03-19 ENCOUNTER — TELEPHONE (OUTPATIENT)
Dept: OBGYN CLINIC | Facility: MEDICAL CENTER | Age: 55
End: 2025-03-19

## 2025-03-19 NOTE — TELEPHONE ENCOUNTER
Left voicemail for pt. 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